# Patient Record
Sex: FEMALE | Race: WHITE
[De-identification: names, ages, dates, MRNs, and addresses within clinical notes are randomized per-mention and may not be internally consistent; named-entity substitution may affect disease eponyms.]

---

## 2018-04-28 ENCOUNTER — HOSPITAL ENCOUNTER (EMERGENCY)
Dept: HOSPITAL 68 - ERH | Age: 73
End: 2018-04-28
Payer: COMMERCIAL

## 2018-04-28 VITALS — BODY MASS INDEX: 26.31 KG/M2 | HEIGHT: 62 IN | WEIGHT: 143 LBS

## 2018-04-28 VITALS — SYSTOLIC BLOOD PRESSURE: 155 MMHG | DIASTOLIC BLOOD PRESSURE: 71 MMHG
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
## 2018-04-28 DIAGNOSIS — R55: Primary | ICD-10-CM

## 2018-04-28 DIAGNOSIS — S00.83XA: ICD-10-CM

## 2018-04-28 LAB
ABSOLUTE GRANULOCYTE CT: 9.9 /CUMM (ref 1.4–6.5)
BASOPHILS # BLD: 0 /CUMM (ref 0–0.2)
BASOPHILS NFR BLD: 0.4 % (ref 0–2)
EOSINOPHIL # BLD: 0.1 /CUMM (ref 0–0.7)
EOSINOPHIL NFR BLD: 0.6 % (ref 0–5)
ERYTHROCYTE [DISTWIDTH] IN BLOOD BY AUTOMATED COUNT: 14.5 % (ref 11.5–14.5)
GRANULOCYTES NFR BLD: 79.8 % (ref 42.2–75.2)
HCT VFR BLD CALC: 38.4 % (ref 37–47)
LYMPHOCYTES # BLD: 1.4 /CUMM (ref 1.2–3.4)
MCH RBC QN AUTO: 28.2 PG (ref 27–31)
MCHC RBC AUTO-ENTMCNC: 33 G/DL (ref 33–37)
MCV RBC AUTO: 85.4 FL (ref 81–99)
MONOCYTES # BLD: 1 /CUMM (ref 0.1–0.6)
PLATELET # BLD: 273 /CUMM (ref 130–400)
PMV BLD AUTO: 7.7 FL (ref 7.4–10.4)
PROTHROMBIN TIME: 11.7 SEC (ref 9.4–12.5)
RED BLOOD CELL CT: 4.5 /CUMM (ref 4.2–5.4)
WBC # BLD AUTO: 12.4 /CUMM (ref 4.8–10.8)

## 2018-04-28 NOTE — CT SCAN REPORT
EXAMINATION:
CT HEAD WITHOUT CONTRAST
 
CLINICAL INFORMATION:
Syncope; head injury.
 
COMPARISON:
None
 
TECHNIQUE:
Contiguous axial imaging was performed from the skull base to vertex without
intravenous administration of contrast.
 
DLP:
1393.69 mGy-cm (head and facial bones)
 
FINDINGS:
There is no evidence of acute intracranial hemorrhage or territorial
infarction. No abnormal mass effect or midline shift is seen. Gray to white
matter differentiation is well preserved. No extra-axial fluid collections
are identified.
 
The ventricles are normal in size. There is no abnormal attenuation within
the brain parenchyma. The osseous structures and soft tissues are normal. The
mastoid air cells and visualized portions of the paranasal sinuses are well
aerated. There is a right frontal scalp hematoma.
 
IMPRESSION:
No acute intracranial pathology. A right frontal scalp hematoma is seen,
without underlying fracture or brain injury.

## 2018-04-28 NOTE — ED SYNCOPE COMPLAINT
History of Present Illness
 
General
Chief Complaint: Fall
Stated Complaint: FALL ON BLOOD THINNERS
Source: patient, old records, friend
Exam Limitations: no limitations
 
Vital Signs & Intake/Output
Vital Signs & Intake/Output
 Vital Signs
 
 
Date Time Temp Pulse Resp B/P B/P Pulse O2 O2 Flow FiO2
 
     Mean Ox Delivery Rate 
 
04/28 1623 96.5 70 16 155/71  97 Room Air  
 
04/28 1422 96.0 73 18 158/81  99 Room Air  
 
 
 
Allergies
Coded Allergies:
No Known Allergies (04/28/18)
 
Triage Note:
PT TO ER C/C SYNCOPAL EPISDE WHILE IN BATHROOM
 THIS MORNING. STATES WAS FEELING ILL, NAUSEA, WENT
 TO THE BATHROOM TO VOMIT AND WOKE UP ON THE FLOOR.
 PT TAKES ASA AND PLAVIX DAILY. +HEAD STRIKE.
 ECCHYMOSIS TO B/L ORBITS, SWELLING/HEMATOMA TO
 RIGHT FOREHEAD W/ ABRASION. CURRENTLY AOX3. PT
 ALSO C/O LEFT FOREARM PAIN.
Triage Nurses Notes Reviewed? yes
Timing: single episode today
Precipitating Factors: nausea
Context: fall/near fall
Episode Description:
Nausea and collapsed
Associated Symptoms: nausea/vomiting
LMP (ages 10-50): post menopausal
Pregnant: No
Patient currently breastfeeds: No
HPI:
12 hours prior to admission patient awoke sat on couch and after a while felt 
sick to her stomach and then collapsed and during the bathroom striking her 
forehead and face on the floor.  She is unsure how long she was unconscious.
Currently she denies fever chills nausea vomiting diarrhea abdominal pain chest 
pain shortness breath headache dysuria rash.
She recalls passing out once before without known cause.
 
Past History
 
Travel History
Traveled to [place] past 21 day No
 
Medical History
Any Pertinent Medical History? see below for history
EENT: cataracts, diabetic retinopathy
Cardiovascular: hyperlipidemia, cardiac bypass
Endocrine: diabetes
 
Surgical History
Surgical History: non-contributory
 
Psychosocial History
What is your primary language English
Tobacco Use: Quit >30 days ago
 
Family History
Hx Contributory? No
 
Review of Systems
 
Review of Systems
Constitutional:
Reports: no symptoms. 
EENTM:
Reports: no symptoms. 
Respiratory:
Reports: no symptoms. 
Cardiovascular:
Reports: no symptoms. 
GI:
Reports: no symptoms. 
Genitourinary:
Reports: no symptoms. 
Musculoskeletal:
Reports: no symptoms. 
Skin:
Reports: see HPI. 
Neurological/Psychological:
Reports: see HPI. 
All Other Systems: Reviewed and Negative
 
Physical Exam
 
Physical Exam
General Appearance: well developed/nourished, alert, awake, anxious, mild 
distress
Head: ecchymosis, raccoon eyes
Eyes:
Bilateral: normal appearance, PERRL, EOMI. 
Ears, Nose, Throat: normal pharynx, normal ENT inspection, hearing grossly 
normal
Neck: normal inspection, supple, full range of motion, no midline tenderness
Respiratory: normal breath sounds, chest non-tender, no respiratory distress, 
quiet respiration, lungs clear
Cardiovascular: regular rate/rhythm, normal peripheral pulses, norml femoral 
pulses equa
Gastrointestinal: normal bowel sounds, soft, non-tender, no organomegaly
Back: normal inspection, normal range of motion, no vertebral tenderness
Extremities: normal inspection, normal capillary refill, normal range of motion,
no edema
Psychiatric: awake, alert, oriented x 3
Cranial Nerves: normal hearing, normal speech, PERRL
Coordination/Gait: normal finger to nose, normal gait
Motor/Sensory: no motor/sensory deficits
Reflexes:
2+: bicep (R), bicep (L). 
Skin: intact, normal color, warm/dry
Lymphatic: no anterior cervical madan
 
Core Measures
ACS in differential dx? No
CVA/TIA Diagnosis: No
Sepsis Present: No
Sepsis Focused Exam Completed? No
 
Progress
Differential Diagnosis: orthostatic syncope, vasodepressor syncope, vasovagal
Plan of Care:
 Orders
 
 
Procedure Date/time Status
 
TROPONIN LEVEL 04/28 1501 Complete
 
PROTHROMBIN TIME 04/28 1501 Complete
 
COMPREHENSIVE METABOLIC PANEL 04/28 1501 Complete
 
CBC WITHOUT DIFFERENTIAL 04/28 1501 Complete
 
EKG 04/28 1501 Active
 
 
 Laboratory Tests
 
 
 
04/28/18 1524:
Anion Gap 11, Estimated GFR 55  L, BUN/Creatinine Ratio 30.0  H, Glucose 129  H,
Calcium 9.6, Total Bilirubin 0.9, AST 29, ALT 25, Alkaline Phosphatase 89, 
Troponin I 0.02, Total Protein 7.5, Albumin 4.4, Globulin 3.1, Albumin/Globulin 
Ratio 1.4, PT 11.7, INR 1.07, CBC w Diff NO MAN DIFF REQ, RBC 4.50, MCV 85.4, 
MCH 28.2, MCHC 33.0, RDW 14.5, MPV 7.7, Gran % 79.8  H, Lymphocytes % 11.3  L, 
Monocytes % 7.9, Eosinophils % 0.6, Basophils % 0.4, Absolute Granulocytes 9.9  
H, Absolute Lymphocytes 1.4, Absolute Monocytes 1.0  H, Absolute Eosinophils 0.1
, Absolute Basophils 0
 
Diagnostic Imaging:
Viewed by Me: CT Scan.  Discussed w/RAD: CT Scan. 
Radiology Impression: There is a moderate right frontal scalp hematoma, without 
underlying fracture. The paranasal sinuses appear clear. The bilateral 
ostiomeatal units are patent., No acute intracranial pathology. A right frontal 
scalp hematoma is seen, without underlying fracture or brain injury.
 
Departure
 
Departure
Time of Disposition: 1646
Disposition: HOME OR SELF CARE
Condition: Stable
Clinical Impression
Primary Impression: Syncope and collapse
Secondary Impressions: Contusion of face
Referrals:
Hillary Vieira MD (PCP/Family)
 
Departure Forms:
Customer Survey
General Discharge Information

## 2018-04-28 NOTE — CT SCAN REPORT
EXAMINATION:
CT MAXILLOFACIAL WITHOUT CONTRAST
 
CLINICAL INFORMATION:
Frontal contusion; raccoon eyes.
 
COMPARISON:
None
 
TECHNIQUE:
Multidetector helical imaging was performed in the axial plane with
generation of coronal and sagittal reformatted images.
 
DLP:
1393.69 mGy-cm (head and facial bones)
 
FINDINGS:
 
FRONTAL SINUSES AND DRAINAGE PATHWAYS: Normal.
 
MAXILLARY SINUSES AND DRAINAGE PATHWAYS: Normal.
 
ETHMOID SINUSES: Normal.
 
SPHENOID SINUSES AND DRAINAGE PATHWAYS: Normal.
 
ADDITIONAL RELEVANT FINDINGS: The ostiomeatal complexes are well-aerated. The
lamina papyracea are intact. The nasal passages are clear. The carotid canals
are normally covered by bone. The ethmoid roofs are symmetric. No periapical
disease is seen. The TMJs and orbits are normal. The visualized mastoid air
cells are clear. There is a moderate right frontal scalp hematoma, without
underlying fracture.
 
IMPRESSION:
There is a moderate right frontal scalp hematoma, without underlying
fracture. The paranasal sinuses appear clear. The bilateral ostiomeatal units
are patent.